# Patient Record
Sex: MALE | Race: OTHER | NOT HISPANIC OR LATINO
[De-identification: names, ages, dates, MRNs, and addresses within clinical notes are randomized per-mention and may not be internally consistent; named-entity substitution may affect disease eponyms.]

---

## 2021-03-25 ENCOUNTER — TRANSCRIPTION ENCOUNTER (OUTPATIENT)
Age: 21
End: 2021-03-25

## 2022-07-05 ENCOUNTER — EMERGENCY (EMERGENCY)
Facility: HOSPITAL | Age: 22
LOS: 1 days | Discharge: HOME | End: 2022-07-05
Attending: PERSONAL EMERGENCY RESPONSE ATTENDANT | Admitting: PERSONAL EMERGENCY RESPONSE ATTENDANT

## 2022-07-05 VITALS
TEMPERATURE: 98 F | DIASTOLIC BLOOD PRESSURE: 90 MMHG | HEART RATE: 79 BPM | SYSTOLIC BLOOD PRESSURE: 149 MMHG | RESPIRATION RATE: 17 BRPM | OXYGEN SATURATION: 100 %

## 2022-07-05 PROCEDURE — 99283 EMERGENCY DEPT VISIT LOW MDM: CPT

## 2022-07-05 NOTE — ED PROVIDER NOTE - PHYSICAL EXAMINATION
GENERAL: no acute distress, non-toxic appearing  HEAD: normocephalic, atraumatic  HEENT: PERRLA, EOMI, right ear TM clear intact, cerumen present, left ear -   CARDIAC: regular rate and rhythm  PULM: clear to ascultation bilaterally  NEURO: alert and oriented x 3, normal speech, no gross neurologic deficit  MSK: no visible deformities  SKIN: no visible rashes

## 2022-07-05 NOTE — ED ADULT TRIAGE NOTE - CHIEF COMPLAINT QUOTE
Pt currently being treated for left ear infection with ABX. Pt states recent complete loss of hearing and nausea promoted visit to ED today. Denies fevers

## 2022-07-05 NOTE — ED PROVIDER NOTE - CLINICAL SUMMARY MEDICAL DECISION MAKING FREE TEXT BOX
Patient is a 21y M no PMHx presenting with hearing loss and nausea. Likely ear infection c/b possible impaction vs perforation. Patient likely to need ENT f/u given hearing loss.

## 2022-07-05 NOTE — ED PROVIDER NOTE - OBJECTIVE STATEMENT
Patient is a 21y M no PMHx presenting with hearing loss and nausea. Patient states 1 week ago woke up with ear pan lasting 3 days. Was given ear drops by PCP  neomycin-polymyxin B-hydrocortisone. Patient with left sided hearing loss since weds, nausea since friday, mild intermittent headaches. Denies ear pain, fevers, drainage from ear, vomiting.

## 2022-07-05 NOTE — ED PROVIDER NOTE - NSFOLLOWUPCLINICS_GEN_ALL_ED_FT
[facility] - ENT  Otolaryngology (ENT)  [address] [place] [address]  Phone: [phone]  Fax:

## 2022-07-05 NOTE — ED PROVIDER NOTE - ATTENDING CONTRIBUTION TO CARE
Attending MD Hernandez.  Agree with above. Pt is a 20 yo male with no sig pmhx who presented with L ear pain lasting 3 days assoc with nausea and hearing loss in L ear.  Pt has been using neomycin-poymyxin B.  Pt endorses some assoc mild intermittent HA's.  Denies ear pain at this time, no fevers, drainage from ear or vomiting since onset.  Pt with cerumen debris in bilateral external ear canals and impaction in L ear.  No pain with movement of L ear tragus. Attending MD Hernandez.  Agree with above. Pt is a 20 yo male with no sig pmhx who presented with L ear pain lasting 3 days assoc with nausea and hearing loss in L ear.  Pt has been using neomycin-poymyxin B.  Pt endorses some assoc mild intermittent HA's.  Denies ear pain at this time, no fevers, drainage from ear or vomiting since onset.  Pt with cerumen debris in bilateral external ear canals and impaction in L ear.  No pain with movement of L ear tragus.    Suspect cerumen impaction as etiology of pt's perforated TM.  No purulent drainage, erythema or pain on ear movement.  Pt advised to discontinue otic drops and f/u with ENT.  Avoid drops in ear, avoid fluid/water in ear with bathing.  Return to ED for new/worsening sxs.  Pt reassured that perforation should resolve spontaneously over time but pt should f/u with ENT.

## 2022-07-05 NOTE — ED PROVIDER NOTE - NSFOLLOWUPINSTRUCTIONS_ED_ALL_ED_FT
You were seen in the ED for hearing loss/nausea.    After examination it was determined you have a perforated ear drum in your left ear.    Take Tylenol and/or Ibuprofen every 4-6 hours as needed for pain.    STOP your ear drops.    When taking a shower tilt your head to the left to avoid much water getting into the ear.    Follow-up with the ENT doctors this week.    Avoid swimming until cleared by the ENT.    ***Return to the ED if you have any new or worsening symptoms such as fevers, worsening pain, purulent drainage from the ear, or any other concerning symptoms***      Eardrum Rupture       An eardrum rupture is a hole (perforation) in the eardrum. The eardrum is a thin, round tissue inside the ear. It allows you to hear. This condition may cause pain and hearing loss. There is often little or no long-term hearing loss.      What are the causes?    This condition may be caused by:  •An infection.    •An injury from:  •Putting a thin object into the ear.      •Getting hit on the side of the head.      •Falling onto water or a flat surface.      •Changes in pressure that can happen from flying, scuba diving, or a very loud noise.        •Inserting a cotton swab in the ear.      •Long-term ear problems.      •Surgery on the ear.      •Getting a tube called a PE tube removed or having it fall out. This is a tube placed during a surgery to help with ear problems.        What increases the risk?    •Having had PE tubes put in your ears.      •Having an ear infection.     •Playing sports that:  •Involve balls or contact with other players.      •Take place in water, such as diving, scuba diving, or waterskiing.          What are the signs or symptoms?    •Pain.      •Ringing in the ear.      •Fluid leaking from the ear.      •Hearing loss.      •Dizziness.        How is this treated?    The eardrum often heals on its own in a few weeks. If your eardrum does not heal, your doctor may recommend surgery to fix the eardrum. You may also need antibiotic medicine to help prevent infection.      Follow these instructions at home:    Medicines     •Take over-the-counter and prescription medicines only as told by your doctor.      •If you were prescribed an antibiotic medicine, use it as told by your doctor. Do not stop using it even if you start to feel better.      Caring for your ear     •Keep your ear dry. Follow instructions from your doctor about how to keep your ear dry. You may need to wear waterproof earplugs when bathing and swimming.    •If told, put heat on the affected ear to help with pain. Do this as often as told by your doctor. Use the heat source that your doctor recommends, such as a moist heat pack or a heating pad.  •Place a towel between your skin and the heat source.       •Leave the heat on for 20–30 minutes.       •Take off the heat if your skin turns bright red. This is very important. If you cannot feel pain, heat, or cold, you have a greater risk of getting burned.        General instructions     •Return to your normal activities when your doctor says that it is safe.      •When you play sports in which ear injuries may happen, wear headgear with ear protection.      •Talk to your doctor before you fly on an airplane.      •Keep all follow-up visits.        Contact a doctor if:    •You have a fever.      •You have ear pain.      •You have mucus or blood leaking from your ear.      •You cannot hear.      •You have ringing in the ear.      •You feel dizzy.        Get help right away if:    •You have sudden hearing loss.      •You are very dizzy.      •You get very bad pain in your ear.      •You have weakness in your face.      •You cannot move parts of your face.      These symptoms may be an emergency. Do not wait to see if the symptoms will go away. Get help right away. Call your local emergency services (911 in the [place]       Summary    •An eardrum rupture is a tear that makes a hole in the eardrum.      •The eardrum will likely heal on its own within a few weeks.      •Follow instructions from your doctor about how to keep your ear dry and protected as it heals.      This information is not intended to replace advice given to you by your health care provider. Make sure you discuss any questions you have with your health care provider.

## 2022-07-05 NOTE — ED PROVIDER NOTE - PROGRESS NOTE DETAILS
Lelo Ramires MD PGY2: Hydrogen peroxide/saline mixture placed in left ear for 10 min. After wash out, TM able to be visualized, and is ruptured. No purulent drainage visualized. Minimal blood. Patient to be dc with ENT f/u.

## 2022-07-05 NOTE — ED PROVIDER NOTE - PATIENT PORTAL LINK FT
You can access the FollowMyHealth Patient Portal offered by [facility] by registering at the following website: [email or web address]. By joining [facility]’s FollowMyHealth portal, you will also be able to view your health information using other applications (apps) compatible with our system.

## 2022-07-07 PROBLEM — Z00.00 ENCOUNTER FOR PREVENTIVE HEALTH EXAMINATION: Status: ACTIVE | Noted: 2022-07-07

## 2022-07-08 ENCOUNTER — APPOINTMENT (OUTPATIENT)
Dept: OTOLARYNGOLOGY | Facility: CLINIC | Age: 22
End: 2022-07-08

## 2022-07-08 VITALS
HEIGHT: 67 IN | TEMPERATURE: 97.5 F | OXYGEN SATURATION: 99 % | HEART RATE: 64 BPM | WEIGHT: 160 LBS | BODY MASS INDEX: 25.11 KG/M2 | SYSTOLIC BLOOD PRESSURE: 110 MMHG | DIASTOLIC BLOOD PRESSURE: 62 MMHG

## 2022-07-08 DIAGNOSIS — H61.23 IMPACTED CERUMEN, BILATERAL: ICD-10-CM

## 2022-07-08 DIAGNOSIS — Z78.9 OTHER SPECIFIED HEALTH STATUS: ICD-10-CM

## 2022-07-08 DIAGNOSIS — Z84.89 FAMILY HISTORY OF OTHER SPECIFIED CONDITIONS: ICD-10-CM

## 2022-07-08 DIAGNOSIS — S00.432A CONTUSION OF LEFT EAR, INITIAL ENCOUNTER: ICD-10-CM

## 2022-07-08 PROCEDURE — 69210 REMOVE IMPACTED EAR WAX UNI: CPT

## 2022-07-08 PROCEDURE — 99202 OFFICE O/P NEW SF 15 MIN: CPT | Mod: 25

## 2022-07-08 NOTE — HISTORY OF PRESENT ILLNESS
[de-identified] : 20 y/o M with a h/o L HL for the past 10 days.  he was seen un [facility]gent [facility] and wax was removed.  He did not get better and so he saw his PMD who diagnosed an ear infection.  He was treated with COS drops, which he used for 5 days.  3 days ago he went to the ER at [facility].  He was diagnosed with a possible TM perforation.

## 2022-07-08 NOTE — CONSULT LETTER
[Dear  ___] : Dear  [unfilled], [Consult Letter:] : I had the pleasure of evaluating your patient, [unfilled]. [Please see my note below.] : Please see my note below. [Consult Closing:] : Thank you very much for allowing me to participate in the care of this patient.  If you have any questions, please do not hesitate to contact me. [Sincerely,] : Sincerely, [FreeTextEntry2] : Kobe Graves MD [FreeTextEntry3] : Dhruv Ramirez MD, FACS\par Chief of Otolaryngology [facility]\par  - Dept. of Otolaryngology\par [employer] [facility] [employer]\par \par

## 2022-07-08 NOTE — PHYSICAL EXAM
[de-identified] : CI bilaterally.  L EAC wall with mild bruising present [Midline] : trachea located in midline position [Normal] : no rashes

## 2022-07-08 NOTE — ASSESSMENT
[FreeTextEntry1] : Wax completely removed.  Pt with mild contusion of L EAC from prior attempts at removal.  If pt develops any ear pain he will resume the COS drops.

## 2022-07-08 NOTE — REVIEW OF SYSTEMS
[Seasonal Allergies] : seasonal allergies [Hearing Loss] : hearing loss [Ear Noises] : ear noises [Lightheadedness] : lightheadedness [Negative] : Heme/Lymph

## 2024-03-24 ENCOUNTER — NON-APPOINTMENT (OUTPATIENT)
Age: 24
End: 2024-03-24

## 2024-03-24 PROBLEM — Z78.9 OTHER SPECIFIED HEALTH STATUS: Chronic | Status: ACTIVE | Noted: 2022-07-05

## 2024-03-26 ENCOUNTER — APPOINTMENT (OUTPATIENT)
Dept: OTOLARYNGOLOGY | Facility: CLINIC | Age: 24
End: 2024-03-26
Payer: COMMERCIAL

## 2024-03-26 VITALS
DIASTOLIC BLOOD PRESSURE: 82 MMHG | SYSTOLIC BLOOD PRESSURE: 126 MMHG | WEIGHT: 180 LBS | HEART RATE: 102 BPM | HEIGHT: 67 IN | BODY MASS INDEX: 28.25 KG/M2

## 2024-03-26 DIAGNOSIS — H93.12 TINNITUS, LEFT EAR: ICD-10-CM

## 2024-03-26 DIAGNOSIS — H91.92 UNSPECIFIED HEARING LOSS, LEFT EAR: ICD-10-CM

## 2024-03-26 DIAGNOSIS — H61.22 IMPACTED CERUMEN, LEFT EAR: ICD-10-CM

## 2024-03-26 PROCEDURE — 92557 COMPREHENSIVE HEARING TEST: CPT

## 2024-03-26 PROCEDURE — 99203 OFFICE O/P NEW LOW 30 MIN: CPT

## 2024-03-26 PROCEDURE — 92567 TYMPANOMETRY: CPT

## 2024-03-26 NOTE — CONSULT LETTER
[Dear  ___] : Dear  [unfilled], [Please see my note below.] : Please see my note below. [Sincerely,] : Sincerely,